# Patient Record
Sex: MALE | Race: WHITE | NOT HISPANIC OR LATINO | URBAN - METROPOLITAN AREA
[De-identification: names, ages, dates, MRNs, and addresses within clinical notes are randomized per-mention and may not be internally consistent; named-entity substitution may affect disease eponyms.]

---

## 2017-01-09 ENCOUNTER — FOLLOW UP ESTABLISHED (OUTPATIENT)
Dept: URBAN - METROPOLITAN AREA CLINIC 30 | Facility: CLINIC | Age: 37
End: 2017-01-09
Payer: COMMERCIAL

## 2017-01-09 DIAGNOSIS — Z98.890 OTHER SPECIFIED POSTPROCEDURAL STATES: Primary | ICD-10-CM

## 2017-01-09 PROCEDURE — 92014 COMPRE OPH EXAM EST PT 1/>: CPT | Performed by: OPTOMETRIST

## 2017-01-09 ASSESSMENT — INTRAOCULAR PRESSURE
OS: 11
OD: 11

## 2017-01-09 ASSESSMENT — VISUAL ACUITY
OS: 20/20
OD: 20/20

## 2017-11-17 ENCOUNTER — FOLLOW UP ESTABLISHED (OUTPATIENT)
Dept: URBAN - METROPOLITAN AREA CLINIC 30 | Facility: CLINIC | Age: 37
End: 2017-11-17
Payer: COMMERCIAL

## 2017-11-17 PROCEDURE — 92014 COMPRE OPH EXAM EST PT 1/>: CPT | Performed by: OPTOMETRIST

## 2017-11-17 ASSESSMENT — KERATOMETRY
OD: 42.81
OS: 42.65

## 2017-11-17 ASSESSMENT — VISUAL ACUITY
OD: 20/15
OS: 20/15

## 2017-11-17 ASSESSMENT — INTRAOCULAR PRESSURE
OD: 12
OS: 12

## 2021-12-03 ENCOUNTER — OFFICE VISIT (OUTPATIENT)
Dept: URBAN - METROPOLITAN AREA CLINIC 30 | Facility: CLINIC | Age: 41
End: 2021-12-03
Payer: COMMERCIAL

## 2021-12-03 DIAGNOSIS — H04.123 DRY EYE SYNDROME OF BILATERAL LACRIMAL GLANDS: ICD-10-CM

## 2021-12-03 PROCEDURE — 92004 COMPRE OPH EXAM NEW PT 1/>: CPT | Performed by: OPTOMETRIST

## 2021-12-03 ASSESSMENT — INTRAOCULAR PRESSURE
OD: 18
OS: 17

## 2021-12-03 ASSESSMENT — VISUAL ACUITY
OD: 20/15
OS: 20/15

## 2021-12-03 ASSESSMENT — KERATOMETRY
OD: 42.66
OS: 42.88

## 2021-12-03 NOTE — IMPRESSION/PLAN
Impression: Dry eye syndrome of bilateral lacrimal glands: H04.123. Plan: Works in IT, on computer all day. Discussed blink breaks and rec ATs prn.

## 2021-12-03 NOTE — IMPRESSION/PLAN
Impression: Other specified postprocedural states Plan: Stable and doing well s/p LASIK 1/9/15. [facility]-annual exams.  Discussed impending presbyopia

## 2022-12-09 ENCOUNTER — OFFICE VISIT (OUTPATIENT)
Dept: URBAN - METROPOLITAN AREA CLINIC 30 | Facility: CLINIC | Age: 42
End: 2022-12-09
Payer: COMMERCIAL

## 2022-12-09 DIAGNOSIS — H04.123 DRY EYE SYNDROME OF BILATERAL LACRIMAL GLANDS: Primary | ICD-10-CM

## 2022-12-09 DIAGNOSIS — Z98.890 OTHER SPECIFIED POSTPROCEDURAL STATES: ICD-10-CM

## 2022-12-09 PROCEDURE — 92014 COMPRE OPH EXAM EST PT 1/>: CPT | Performed by: OPTOMETRIST

## 2022-12-09 ASSESSMENT — VISUAL ACUITY
OD: 20/20
OS: 20/20

## 2022-12-09 ASSESSMENT — INTRAOCULAR PRESSURE
OD: 17
OS: 17

## 2022-12-09 ASSESSMENT — KERATOMETRY
OD: 42.84
OS: 42.38

## 2022-12-09 NOTE — IMPRESSION/PLAN
Impression: Other specified postprocedural states Plan: Stable and doing well s/p LASIK 1/9/15. [employer]-annual exams. Discussed presbyopia-still not bothering him.

## 2022-12-09 NOTE — IMPRESSION/PLAN
Impression: Dry eye syndrome of bilateral lacrimal glands: H04.123. Plan: Has not been bothersome. Works in IT, on computer all day. Discussed blink breaks and rec ATs prn. Stay hydrated, avoid fans.

## 2023-12-08 ENCOUNTER — OFFICE VISIT (OUTPATIENT)
Dept: URBAN - METROPOLITAN AREA CLINIC 30 | Facility: CLINIC | Age: 43
End: 2023-12-08
Payer: COMMERCIAL

## 2023-12-08 DIAGNOSIS — H04.123 DRY EYE SYNDROME OF BILATERAL LACRIMAL GLANDS: ICD-10-CM

## 2023-12-08 DIAGNOSIS — Z98.890 OTHER SPECIFIED POSTPROCEDURAL STATES: Primary | ICD-10-CM

## 2023-12-08 PROCEDURE — 92014 COMPRE OPH EXAM EST PT 1/>: CPT | Performed by: OPTOMETRIST

## 2023-12-08 ASSESSMENT — VISUAL ACUITY
OS: 20/20
OD: 20/20

## 2023-12-08 ASSESSMENT — INTRAOCULAR PRESSURE
OD: 18
OS: 16

## 2023-12-08 ASSESSMENT — KERATOMETRY
OD: 42.78
OS: 42.84

## 2024-12-05 ENCOUNTER — OFFICE VISIT (OUTPATIENT)
Dept: URBAN - METROPOLITAN AREA CLINIC 30 | Facility: CLINIC | Age: 44
End: 2024-12-05
Payer: COMMERCIAL

## 2024-12-05 DIAGNOSIS — H04.123 DRY EYE SYNDROME OF BILATERAL LACRIMAL GLANDS: ICD-10-CM

## 2024-12-05 DIAGNOSIS — Z98.890 OTHER SPECIFIED POSTPROCEDURAL STATES: Primary | ICD-10-CM

## 2024-12-05 PROCEDURE — 92014 COMPRE OPH EXAM EST PT 1/>: CPT | Performed by: OPTOMETRIST

## 2024-12-05 ASSESSMENT — INTRAOCULAR PRESSURE
OD: 16
OS: 16

## 2024-12-05 ASSESSMENT — VISUAL ACUITY
OD: 20/20
OS: 20/20

## 2024-12-05 ASSESSMENT — KERATOMETRY
OD: 42.63
OS: 42.63